# Patient Record
Sex: FEMALE | Race: WHITE | Employment: OTHER | URBAN - METROPOLITAN AREA
[De-identification: names, ages, dates, MRNs, and addresses within clinical notes are randomized per-mention and may not be internally consistent; named-entity substitution may affect disease eponyms.]

---

## 2024-09-09 ENCOUNTER — TELEPHONE (OUTPATIENT)
Age: 29
End: 2024-09-09

## 2024-09-09 ENCOUNTER — ANESTHESIA EVENT (OUTPATIENT)
Dept: PERIOP | Facility: HOSPITAL | Age: 29
End: 2024-09-09
Payer: COMMERCIAL

## 2024-09-09 ENCOUNTER — OFFICE VISIT (OUTPATIENT)
Dept: SURGERY | Facility: CLINIC | Age: 29
End: 2024-09-09

## 2024-09-09 VITALS
RESPIRATION RATE: 18 BRPM | BODY MASS INDEX: 25.18 KG/M2 | TEMPERATURE: 98.2 F | HEIGHT: 67 IN | SYSTOLIC BLOOD PRESSURE: 118 MMHG | DIASTOLIC BLOOD PRESSURE: 78 MMHG | HEART RATE: 98 BPM | OXYGEN SATURATION: 97 % | WEIGHT: 160.4 LBS

## 2024-09-09 DIAGNOSIS — L05.01 PILONIDAL CYST WITH ABSCESS: Primary | ICD-10-CM

## 2024-09-09 PROCEDURE — 99203 OFFICE O/P NEW LOW 30 MIN: CPT | Performed by: SURGERY

## 2024-09-09 RX ORDER — FLUCONAZOLE 150 MG/1
150 TABLET ORAL DAILY
COMMUNITY
Start: 2024-06-11 | End: 2024-09-09

## 2024-09-09 RX ORDER — IBUPROFEN 600 MG/1
600 TABLET, FILM COATED ORAL EVERY 6 HOURS PRN
COMMUNITY

## 2024-09-09 RX ORDER — SULFAMETHOXAZOLE/TRIMETHOPRIM 800-160 MG
1 TABLET ORAL EVERY 12 HOURS SCHEDULED
COMMUNITY
Start: 2024-09-06 | End: 2024-09-10

## 2024-09-09 RX ORDER — METRONIDAZOLE 500 MG/1
500 TABLET ORAL EVERY 12 HOURS
COMMUNITY
Start: 2024-06-11 | End: 2024-09-09

## 2024-09-09 RX ORDER — CEPHALEXIN 500 MG/1
500 CAPSULE ORAL
COMMUNITY
Start: 2024-09-06 | End: 2024-09-10

## 2024-09-09 RX ORDER — SODIUM CHLORIDE, SODIUM LACTATE, POTASSIUM CHLORIDE, CALCIUM CHLORIDE 600; 310; 30; 20 MG/100ML; MG/100ML; MG/100ML; MG/100ML
125 INJECTION, SOLUTION INTRAVENOUS CONTINUOUS
Status: CANCELLED | OUTPATIENT
Start: 2024-09-09

## 2024-09-09 NOTE — PROGRESS NOTES
Consult- General Surgery   Shital Carreno 28 y.o. female MRN: [id number]  Unit/Bed#:  Encounter: [id number]    Assessment & Plan     Assessment:  Pilonidal cyst with abscess  Plan:  In light of the size of the abscess I advised the patient to undergo incision and drainage of pilonidal cyst abscess with open wound packing tomorrow under LMA intubation at the hospital.  I discussed the operative procedure, risk, benefits, alternatives and possible complications, she understood and agreed to proceed.    History of Present Illness     HPI:  Shital Carreno is a 28 y.o. female who presents to my office accompanied by her mother for evaluation of pilonidal cyst.  The patient stated that she started complaining of bleeding in the upper butt crack last Tuesday, subsequently she noted a lump that became painful with redness and increased temperature.  She was seen by a dermatologist today and referred to us for surgical evaluation.  The patient was on antibiotics with Keflex and Bactrim, she was also told to apply heat to the area with warm bath.  She denies having any fever, chills or any other constitutional symptoms except for the significant amount of pain.    Review of Systems  The rest of the review of system total of 10 were negative except for the HPI.    Historical Information   History reviewed. No pertinent past medical history.  Past Surgical History:   Procedure Laterality Date    WISDOM TOOTH EXTRACTION       Social History   Social History     Substance and Sexual Activity   Alcohol Use Yes    Comment: social     Social History     Substance and Sexual Activity   Drug Use Never     Social History     Tobacco Use   Smoking Status Never    Passive exposure: Never   Smokeless Tobacco Never     Family History: non-contributory    Meds/Allergies   all medications and allergies reviewed     Current Outpatient Medications:     cephalexin (KEFLEX) 500 mg capsule, Take 500 mg by mouth, Disp: , Rfl:     ibuprofen  "(MOTRIN) 600 mg tablet, Take 600 mg by mouth every 6 (six) hours as needed for mild pain, Disp: , Rfl:     sulfamethoxazole-trimethoprim (BACTRIM DS) 800-160 mg per tablet, Take 1 tablet by mouth every 12 (twelve) hours, Disp: , Rfl:   No Known Allergies    Objective     Current Vitals:   Blood Pressure: 118/78 (09/09/24 1357)  Pulse: 98 (09/09/24 1357)  Temperature: 98.2 °F (36.8 °C) (09/09/24 1357)  Respirations: 18 (09/09/24 1357)  Height: 5' 7\" (170.2 cm) (09/09/24 1357)  Weight - Scale: 72.8 kg (160 lb 6.4 oz) (09/09/24 1357)  SpO2: 97 % (09/09/24 1357)    Physical Exam  Vitals and nursing note reviewed.   Constitutional:       General: She is not in acute distress.  Cardiovascular:      Rate and Rhythm: Normal rate and regular rhythm.   Pulmonary:      Effort: No respiratory distress.      Breath sounds: Normal breath sounds.   Abdominal:      Palpations: Abdomen is soft. There is no mass.      Tenderness: There is no abdominal tenderness.   Genitourinary:     Comments: There is an abscess in the upper butt crack measuring approximately 5 x 6 cm, increased temperature, small amount of drainage through the skin, tender to palpation.  Skin:     General: Skin is warm.      Coloration: Skin is not jaundiced.      Findings: No erythema or rash.   Neurological:      Mental Status: She is alert and oriented to person, place, and time.      Cranial Nerves: No cranial nerve deficit.   Psychiatric:         Mood and Affect: Mood normal.         Behavior: Behavior normal.       "

## 2024-09-09 NOTE — H&P (VIEW-ONLY)
Consult- General Surgery   Shital Carreno 28 y.o. female MRN: [id number]  Unit/Bed#:  Encounter: [id number]    Assessment & Plan     Assessment:  Pilonidal cyst with abscess  Plan:  In light of the size of the abscess I advised the patient to undergo incision and drainage of pilonidal cyst abscess with open wound packing tomorrow under LMA intubation at the hospital.  I discussed the operative procedure, risk, benefits, alternatives and possible complications, she understood and agreed to proceed.    History of Present Illness     HPI:  Shital Carreno is a 28 y.o. female who presents to my office accompanied by her mother for evaluation of pilonidal cyst.  The patient stated that she started complaining of bleeding in the upper butt crack last Tuesday, subsequently she noted a lump that became painful with redness and increased temperature.  She was seen by a dermatologist today and referred to us for surgical evaluation.  The patient was on antibiotics with Keflex and Bactrim, she was also told to apply heat to the area with warm bath.  She denies having any fever, chills or any other constitutional symptoms except for the significant amount of pain.    Review of Systems  The rest of the review of system total of 10 were negative except for the HPI.    Historical Information   History reviewed. No pertinent past medical history.  Past Surgical History:   Procedure Laterality Date    WISDOM TOOTH EXTRACTION       Social History   Social History     Substance and Sexual Activity   Alcohol Use Yes    Comment: social     Social History     Substance and Sexual Activity   Drug Use Never     Social History     Tobacco Use   Smoking Status Never    Passive exposure: Never   Smokeless Tobacco Never     Family History: non-contributory    Meds/Allergies   all medications and allergies reviewed     Current Outpatient Medications:     cephalexin (KEFLEX) 500 mg capsule, Take 500 mg by mouth, Disp: , Rfl:     ibuprofen  "(MOTRIN) 600 mg tablet, Take 600 mg by mouth every 6 (six) hours as needed for mild pain, Disp: , Rfl:     sulfamethoxazole-trimethoprim (BACTRIM DS) 800-160 mg per tablet, Take 1 tablet by mouth every 12 (twelve) hours, Disp: , Rfl:   No Known Allergies    Objective     Current Vitals:   Blood Pressure: 118/78 (09/09/24 1357)  Pulse: 98 (09/09/24 1357)  Temperature: 98.2 °F (36.8 °C) (09/09/24 1357)  Respirations: 18 (09/09/24 1357)  Height: 5' 7\" (170.2 cm) (09/09/24 1357)  Weight - Scale: 72.8 kg (160 lb 6.4 oz) (09/09/24 1357)  SpO2: 97 % (09/09/24 1357)    Physical Exam  Vitals and nursing note reviewed.   Constitutional:       General: She is not in acute distress.  Cardiovascular:      Rate and Rhythm: Normal rate and regular rhythm.   Pulmonary:      Effort: No respiratory distress.      Breath sounds: Normal breath sounds.   Abdominal:      Palpations: Abdomen is soft. There is no mass.      Tenderness: There is no abdominal tenderness.   Genitourinary:     Comments: There is an abscess in the upper butt crack measuring approximately 5 x 6 cm, increased temperature, small amount of drainage through the skin, tender to palpation.  Skin:     General: Skin is warm.      Coloration: Skin is not jaundiced.      Findings: No erythema or rash.   Neurological:      Mental Status: She is alert and oriented to person, place, and time.      Cranial Nerves: No cranial nerve deficit.   Psychiatric:         Mood and Affect: Mood normal.         Behavior: Behavior normal.       "

## 2024-09-09 NOTE — TELEPHONE ENCOUNTER
Pt was referred by her dermatologist for a polinidal cyst. Dr. Blackman has a 2:00 today, but then next available is 10/02.  She is going to call back because she also has a name of another surgeon.

## 2024-09-10 ENCOUNTER — HOSPITAL ENCOUNTER (OUTPATIENT)
Facility: HOSPITAL | Age: 29
Setting detail: OUTPATIENT SURGERY
Discharge: HOME | End: 2024-09-10
Attending: SURGERY | Admitting: SURGERY
Payer: COMMERCIAL

## 2024-09-10 ENCOUNTER — ANESTHESIA (OUTPATIENT)
Dept: PERIOP | Facility: HOSPITAL | Age: 29
End: 2024-09-10
Payer: COMMERCIAL

## 2024-09-10 VITALS
RESPIRATION RATE: 21 BRPM | SYSTOLIC BLOOD PRESSURE: 98 MMHG | BODY MASS INDEX: 25.02 KG/M2 | TEMPERATURE: 97 F | DIASTOLIC BLOOD PRESSURE: 58 MMHG | WEIGHT: 159.39 LBS | OXYGEN SATURATION: 100 % | HEART RATE: 65 BPM | HEIGHT: 67 IN

## 2024-09-10 DIAGNOSIS — L05.01 PILONIDAL CYST WITH ABSCESS: ICD-10-CM

## 2024-09-10 LAB
EXT PREGNANCY TEST URINE: NEGATIVE
EXT. CONTROL: NORMAL

## 2024-09-10 PROCEDURE — 88304 TISSUE EXAM BY PATHOLOGIST: CPT | Performed by: PATHOLOGY

## 2024-09-10 PROCEDURE — 81025 URINE PREGNANCY TEST: CPT | Performed by: SURGERY

## 2024-09-10 PROCEDURE — 10081 I&D PILONIDAL CYST COMP: CPT | Performed by: SURGERY

## 2024-09-10 RX ORDER — SODIUM CHLORIDE, SODIUM LACTATE, POTASSIUM CHLORIDE, CALCIUM CHLORIDE 600; 310; 30; 20 MG/100ML; MG/100ML; MG/100ML; MG/100ML
INJECTION, SOLUTION INTRAVENOUS CONTINUOUS PRN
Status: DISCONTINUED | OUTPATIENT
Start: 2024-09-10 | End: 2024-09-10

## 2024-09-10 RX ORDER — SUCCINYLCHOLINE/SOD CL,ISO/PF 100 MG/5ML
SYRINGE (ML) INTRAVENOUS AS NEEDED
Status: DISCONTINUED | OUTPATIENT
Start: 2024-09-10 | End: 2024-09-10

## 2024-09-10 RX ORDER — MIDAZOLAM HYDROCHLORIDE 2 MG/2ML
INJECTION, SOLUTION INTRAMUSCULAR; INTRAVENOUS AS NEEDED
Status: DISCONTINUED | OUTPATIENT
Start: 2024-09-10 | End: 2024-09-10

## 2024-09-10 RX ORDER — TRAMADOL HYDROCHLORIDE 50 MG/1
TABLET ORAL
Status: COMPLETED
Start: 2024-09-10 | End: 2024-09-10

## 2024-09-10 RX ORDER — ONDANSETRON 2 MG/ML
4 INJECTION INTRAMUSCULAR; INTRAVENOUS EVERY 8 HOURS PRN
Status: DISCONTINUED | OUTPATIENT
Start: 2024-09-10 | End: 2024-09-10 | Stop reason: HOSPADM

## 2024-09-10 RX ORDER — SODIUM CHLORIDE, SODIUM LACTATE, POTASSIUM CHLORIDE, CALCIUM CHLORIDE 600; 310; 30; 20 MG/100ML; MG/100ML; MG/100ML; MG/100ML
125 INJECTION, SOLUTION INTRAVENOUS CONTINUOUS
Status: DISCONTINUED | OUTPATIENT
Start: 2024-09-10 | End: 2024-09-10 | Stop reason: HOSPADM

## 2024-09-10 RX ORDER — TRAMADOL HYDROCHLORIDE 50 MG/1
50 TABLET ORAL EVERY 6 HOURS PRN
Qty: 10 TABLET | Refills: 0 | Status: SHIPPED | OUTPATIENT
Start: 2024-09-10 | End: 2024-09-20

## 2024-09-10 RX ORDER — LIDOCAINE HYDROCHLORIDE 10 MG/ML
INJECTION, SOLUTION EPIDURAL; INFILTRATION; INTRACAUDAL; PERINEURAL AS NEEDED
Status: DISCONTINUED | OUTPATIENT
Start: 2024-09-10 | End: 2024-09-10

## 2024-09-10 RX ORDER — CEFAZOLIN SODIUM 1 G/50ML
1000 SOLUTION INTRAVENOUS ONCE
Status: COMPLETED | OUTPATIENT
Start: 2024-09-10 | End: 2024-09-10

## 2024-09-10 RX ORDER — ONDANSETRON 2 MG/ML
INJECTION INTRAMUSCULAR; INTRAVENOUS AS NEEDED
Status: DISCONTINUED | OUTPATIENT
Start: 2024-09-10 | End: 2024-09-10

## 2024-09-10 RX ORDER — ACETAMINOPHEN 325 MG/1
975 TABLET ORAL EVERY 8 HOURS PRN
Status: DISCONTINUED | OUTPATIENT
Start: 2024-09-10 | End: 2024-09-10 | Stop reason: HOSPADM

## 2024-09-10 RX ORDER — FENTANYL CITRATE/PF 50 MCG/ML
50 SYRINGE (ML) INJECTION
Status: DISCONTINUED | OUTPATIENT
Start: 2024-09-10 | End: 2024-09-10 | Stop reason: HOSPADM

## 2024-09-10 RX ORDER — MAGNESIUM HYDROXIDE 1200 MG/15ML
LIQUID ORAL AS NEEDED
Status: DISCONTINUED | OUTPATIENT
Start: 2024-09-10 | End: 2024-09-10 | Stop reason: HOSPADM

## 2024-09-10 RX ORDER — TRAMADOL HYDROCHLORIDE 50 MG/1
50 TABLET ORAL EVERY 6 HOURS PRN
Status: DISCONTINUED | OUTPATIENT
Start: 2024-09-10 | End: 2024-09-10 | Stop reason: HOSPADM

## 2024-09-10 RX ORDER — HYDROMORPHONE HCL/PF 1 MG/ML
0.5 SYRINGE (ML) INJECTION
Status: DISCONTINUED | OUTPATIENT
Start: 2024-09-10 | End: 2024-09-10 | Stop reason: HOSPADM

## 2024-09-10 RX ORDER — FENTANYL CITRATE 50 UG/ML
INJECTION, SOLUTION INTRAMUSCULAR; INTRAVENOUS AS NEEDED
Status: DISCONTINUED | OUTPATIENT
Start: 2024-09-10 | End: 2024-09-10

## 2024-09-10 RX ORDER — PROPOFOL 10 MG/ML
INJECTION, EMULSION INTRAVENOUS AS NEEDED
Status: DISCONTINUED | OUTPATIENT
Start: 2024-09-10 | End: 2024-09-10

## 2024-09-10 RX ORDER — DEXAMETHASONE SODIUM PHOSPHATE 10 MG/ML
INJECTION, SOLUTION INTRAMUSCULAR; INTRAVENOUS AS NEEDED
Status: DISCONTINUED | OUTPATIENT
Start: 2024-09-10 | End: 2024-09-10

## 2024-09-10 RX ADMIN — LIDOCAINE HYDROCHLORIDE 50 MG: 10 INJECTION, SOLUTION EPIDURAL; INFILTRATION; INTRACAUDAL; PERINEURAL at 09:04

## 2024-09-10 RX ADMIN — TRAMADOL HYDROCHLORIDE 50 MG: 50 TABLET ORAL at 10:31

## 2024-09-10 RX ADMIN — SODIUM CHLORIDE, SODIUM LACTATE, POTASSIUM CHLORIDE, AND CALCIUM CHLORIDE: .6; .31; .03; .02 INJECTION, SOLUTION INTRAVENOUS at 08:16

## 2024-09-10 RX ADMIN — TRAMADOL HYDROCHLORIDE 50 MG: 50 TABLET, COATED ORAL at 10:31

## 2024-09-10 RX ADMIN — FENTANYL CITRATE 100 MCG: 50 INJECTION, SOLUTION INTRAMUSCULAR; INTRAVENOUS at 09:04

## 2024-09-10 RX ADMIN — DEXMEDETOMIDINE HYDROCHLORIDE 10 MCG: 100 INJECTION, SOLUTION, CONCENTRATE INTRAVENOUS at 09:19

## 2024-09-10 RX ADMIN — ONDANSETRON 4 MG: 2 INJECTION INTRAMUSCULAR; INTRAVENOUS at 09:31

## 2024-09-10 RX ADMIN — MIDAZOLAM HYDROCHLORIDE 2 MG: 1 INJECTION, SOLUTION INTRAMUSCULAR; INTRAVENOUS at 08:58

## 2024-09-10 RX ADMIN — Medication 90 MG: at 09:04

## 2024-09-10 RX ADMIN — DEXMEDETOMIDINE HYDROCHLORIDE 12 MCG: 100 INJECTION, SOLUTION, CONCENTRATE INTRAVENOUS at 09:07

## 2024-09-10 RX ADMIN — DEXMEDETOMIDINE HYDROCHLORIDE 8 MCG: 100 INJECTION, SOLUTION, CONCENTRATE INTRAVENOUS at 09:04

## 2024-09-10 RX ADMIN — PROPOFOL 200 MG: 10 INJECTION, EMULSION INTRAVENOUS at 09:04

## 2024-09-10 RX ADMIN — DEXAMETHASONE SODIUM PHOSPHATE 10 MG: 10 INJECTION INTRAMUSCULAR; INTRAVENOUS at 09:31

## 2024-09-10 RX ADMIN — CEFAZOLIN SODIUM 1000 MG: 1 SOLUTION INTRAVENOUS at 09:10

## 2024-09-10 NOTE — ANESTHESIA POSTPROCEDURE EVALUATION
Post-Op Assessment Note    CV Status:  Stable  Pain Score: 0    Pain management: adequate       Mental Status:  Sleepy   Hydration Status:  Stable   PONV Controlled:  None   Airway Patency:  Patent  Airway: intubated  Two or more mitigation strategies used for obstructive sleep apnea   Post Op Vitals Reviewed: Yes    No anethesia notable event occurred.    Staff: CRNA               BP   101/54   Temp   98   Pulse  77   Resp   16   SpO2   98

## 2024-09-10 NOTE — ANESTHESIA PREPROCEDURE EVALUATION
Procedure:  INCISION AND DRAINAGE (I&D) BUTTOCK abscess (Buttocks)    Relevant Problems   ANESTHESIA (within normal limits)      CARDIO (within normal limits)      ENDO (within normal limits)      GI/HEPATIC (within normal limits)      /RENAL (within normal limits)      GYN (within normal limits)      HEMATOLOGY (within normal limits)      MUSCULOSKELETAL (within normal limits)      NEURO/PSYCH (within normal limits)      PULMONARY (within normal limits)        Physical Exam    Airway    Mallampati score: II  TM Distance: >3 FB  Neck ROM: full     Dental        Cardiovascular  Cardiovascular exam normal    Pulmonary  Pulmonary exam normal     Other Findings  post-pubertal.      Anesthesia Plan  ASA Score- 1     Anesthesia Type- general with ASA Monitors.         Additional Monitors:     Airway Plan: ETT.           Plan Factors-Exercise tolerance (METS): >4 METS.    Chart reviewed. EKG reviewed. Imaging results reviewed. Existing labs reviewed. Patient summary reviewed.    Patient is not a current smoker.      Obstructive sleep apnea risk education given perioperatively.        Induction- intravenous.    Postoperative Plan- Plan for postoperative opioid use. Planned trial extubation        Informed Consent- Anesthetic plan and risks discussed with patient.  I personally reviewed this patient with the CRNA. Discussed and agreed on the Anesthesia Plan with the CRNA..

## 2024-09-10 NOTE — OP NOTE
OPERATIVE REPORT  PATIENT NAME: Shital Carreno    :  1995  MRN: [id number]  Pt Location: [facility] OR ROOM 05    SURGERY DATE: 9/10/2024    Surgeons and Role:     * Rolando Blackman MD - Primary    Preop Diagnosis:  Pilonidal cyst with abscess [L05.01]    Post-Op Diagnosis Codes:     * Pilonidal cyst with abscess [L05.01]    Procedure(s):  INCISION AND DRAINAGE (I&D) BUTTOCK abscess    Specimen(s):  ID Type Source Tests Collected by Time Destination   1 : Pilonidal Cyst Tissue Pilonidal Cyst/Sinus TISSUE EXAM oRlando Blackman MD 9/10/2024 0923        Estimated Blood Loss:   10 mL    Drains:  None    Anesthesia Type:   General/LMA    Operative Indications:  Pilonidal cyst with abscess [L05.01]    Operative Findings:  Large abscess cavity measuring approximately 5 cm, copious amount of foul-smelling pus drained out.    Complications:   None    Procedure and Technique:  The patient was identified and the patient was placed in the operating table in a supine position.  After adequate esthesia induction and satisfactory endotracheal intubation she was repositioned in prone.  The upper butt crack was prepped and draped in sterile usual fashion with Betadine solution.  Timeout was called the patient was identified as was surgical site.    An elliptical incision was made over the abscess cavity with a scalpel, taken down through the subcutaneous tissue with cautery, copious amount of foul-smelling pus was drained out.  The incision was taken down to the sacrum.  The entire abscess cavity was removed.  Hemostasis was accomplished with cautery.  Wound was copiously irrigated with saline solution.  Wound was packed open with Kerlix and saline, sterile dressing was applied.  At the end of the case instrument, needles, and sponges counts were correct.  Patient tolerated procedure well.   I was present for the entire procedure. and A qualified resident physician was not available.    Patient Disposition:  PACU , hemodynamically  stable, and extubated and stable             SIGNATURE: Rolando Blackman MD  DATE: September 10, 2024  TIME: 9:38 AM

## 2024-09-10 NOTE — DISCHARGE INSTR - AVS FIRST PAGE
SURGERY INSTRUCTIONS    No diet restriction for this surgery  May shower every day starting Thursday 9/12  Remove packing in the shower Thursday 9/12  Dry wound well after showering and apply dry gauze on the surface of the wound  Daily dressings changes or more often as needed  Call office to make an appointment in 2 weeks [phone]  Call office with any issues regarding the surgery  No driving, heavy lifting or strenuous exercise for one week  Resume home medications starting today  Resume blood thinners starting tomorrow.  (Aspirin, Coumadin, Eliquis, Xarelto)  Apply ice to the incisions. 10 minutes every hour for 2 days  May take Tramadol, regular Tylenol or ibuprofen for pain. Alternating narcotics with ibuprofen or Advil will have better pain control.  May take Colace for constipation  Please let us know how we did, you will receive a survey in the mail or via email.  Please respond to help us improved.

## 2024-09-11 ENCOUNTER — TELEPHONE (OUTPATIENT)
Age: 29
End: 2024-09-11

## 2024-09-11 NOTE — TELEPHONE ENCOUNTER
Patients mother called in to go over post-op bandage care. Warm transferred patient to [facility] for further assistance.

## 2024-09-11 NOTE — TELEPHONE ENCOUNTER
Patients mother calling about dressing change instructions. Stated she removed the outer 4x4s but was unsure if she was to remove the packing. Advised that she could leave packing until tomorrow and remove in the shower per discharge instructions.  Notes minimal drainage on packing and stated there was no drainage on 4x4s.    She verbalized understanding and will return call with any further questions  #[phone]

## 2024-09-18 PROCEDURE — 88304 TISSUE EXAM BY PATHOLOGIST: CPT | Performed by: PATHOLOGY

## 2024-10-01 ENCOUNTER — NURSE TRIAGE (OUTPATIENT)
Age: 29
End: 2024-10-01

## 2024-10-01 NOTE — PATIENT COMMUNICATION
"Pt called in. Pt POD 21 Procedure: INCISION AND DRAINAGE (I&D) BUTTOCK abscess (Buttocks) with Dr. Blackman.     Pt reports \"white bumps\" in and around wound. Denies any other sxs including acute pain, endorses continued minimal drainage on gauze.     [email or web address] message sent to Pt requesting photo. Pt agreeable to send photo. Will await Pt's [employer] response.   "

## 2024-10-17 ENCOUNTER — OFFICE VISIT (OUTPATIENT)
Dept: SURGERY | Facility: CLINIC | Age: 29
End: 2024-10-17

## 2024-10-17 VITALS
HEIGHT: 67 IN | HEART RATE: 70 BPM | TEMPERATURE: 97.8 F | RESPIRATION RATE: 18 BRPM | WEIGHT: 167 LBS | OXYGEN SATURATION: 98 % | DIASTOLIC BLOOD PRESSURE: 72 MMHG | BODY MASS INDEX: 26.21 KG/M2 | SYSTOLIC BLOOD PRESSURE: 108 MMHG

## 2024-10-17 DIAGNOSIS — Z48.89 POSTOPERATIVE VISIT: Primary | ICD-10-CM

## 2024-10-17 PROCEDURE — 99024 POSTOP FOLLOW-UP VISIT: CPT | Performed by: SURGERY

## 2024-10-17 NOTE — PROGRESS NOTES
"Post-Op Follow Up- General Surgery   Shital Carreno 29 y.o. female MRN: [id number]  Unit/Bed#:  Encounter: [id number]    Assessment & Plan     Assessment:  Status post incision and drainage of infected pilonidal cyst with abscess, improving  Plan:  Wound is healing nicely with good granulation tissue.  Patient will continue with daily dressing changes and she will return to my office in 1 month for follow-up.    History of Present Illness     HPI:  Shital Carreno is a 29 y.o. female who presents to my office for first postop follow-up after incision and drainage open wound packing of infected spinal cyst with abscess from September 10.  Patient stated doing well, having no pain or discomfort from the incision.  Patient is back to work for the past 3 weeks.    Historical Information   Past Medical History:   Diagnosis Date    HPV (human papilloma virus) infection      Past Surgical History:   Procedure Laterality Date    INCISION AND DRAINAGE OF WOUND N/A 9/10/2024    Procedure: INCISION AND DRAINAGE (I&D) BUTTOCK abscess;  Surgeon: Rolando Blackman MD;  Location: [facility] OR;  Service: General    WISDOM TOOTH EXTRACTION       Social History   Social History     Substance and Sexual Activity   Alcohol Use Yes    Comment: social     Social History     Substance and Sexual Activity   Drug Use Never     Social History     Tobacco Use   Smoking Status Former    Types: Cigarettes    Passive exposure: Never   Smokeless Tobacco Never     Family History: Family history non-contributory    Meds/Allergies   all medications and allergies reviewed     Current Outpatient Medications:     ibuprofen (MOTRIN) 600 mg tablet, Take 600 mg by mouth every 6 (six) hours as needed for mild pain, Disp: , Rfl:   No Known Allergies    Objective     Current Vitals:   Blood Pressure: 108/72 (10/17/24 1425)  Pulse: 70 (10/17/24 1425)  Temperature: 97.8 °F (36.6 °C) (10/17/24 1425)  Respirations: 18 (10/17/24 1425)  Height: 5' 7\" (170.2 cm) " (10/17/24 1425)  Weight - Scale: 75.8 kg (167 lb) (10/17/24 1425)  SpO2: 98 % (10/17/24 1425)    Physical Exam  Vitals and nursing note reviewed.   Genitourinary:     Comments: Incision from the upper butt crack is healing nicely with good granulation tissue measuring approximately 4 x 1.5 cm and it is 2.5 cm deep, no evidence of infection.

## 2024-12-09 ENCOUNTER — OFFICE VISIT (OUTPATIENT)
Dept: SURGERY | Facility: CLINIC | Age: 29
End: 2024-12-09

## 2024-12-09 VITALS
BODY MASS INDEX: 26.27 KG/M2 | WEIGHT: 167.4 LBS | HEIGHT: 67 IN | SYSTOLIC BLOOD PRESSURE: 120 MMHG | TEMPERATURE: 97.9 F | DIASTOLIC BLOOD PRESSURE: 74 MMHG | HEART RATE: 57 BPM | RESPIRATION RATE: 18 BRPM | OXYGEN SATURATION: 97 %

## 2024-12-09 DIAGNOSIS — Z09 POSTOPERATIVE FOLLOW-UP: Primary | ICD-10-CM

## 2024-12-09 PROCEDURE — 99024 POSTOP FOLLOW-UP VISIT: CPT | Performed by: SURGERY

## 2024-12-09 NOTE — PROGRESS NOTES
"Name: Shital Carreno      : 1995      MRN: [id number]  Encounter Provider: Rolando Blackman MD  Encounter Date: 2024   Encounter department: [facility] SURGERY [place]  :  Assessment & Plan  Postoperative follow-up  Status post incision and drainage of infected pilonidal cyst with abscess, improving.     Patient was advised to do dressing changes twice a day with dry gauze.  She will return to my office in 2 weeks for follow-up.      History of Present Illness   HPI  Shital Carreno is a 29 y.o. female who presents to my office for second postop follow-up after incision and drainage of pilonidal cyst abscess from September 10.  Patient offers no complaints at this time.  She stated that the wound is not healed completely.       Objective   /74 (BP Location: Left arm, Patient Position: Sitting, Cuff Size: Standard)   Pulse 57   Temp 97.9 °F (36.6 °C)   Resp 18   Ht 5' 7\" (1.702 m)   Wt 75.9 kg (167 lb 6.4 oz)   SpO2 97%   BMI 26.22 kg/m²      Physical Exam  Vitals and nursing note reviewed.   Genitourinary:     Comments: Incision from the upper buttock has hypergranulation tissue, measures approximately 3 x 1 cm and it is 1.5 cm deep, no evidence of infection at this time.  Patient was given specific instructions over the wound to heal faster.          "

## 2024-12-17 ENCOUNTER — TELEPHONE (OUTPATIENT)
Dept: SURGERY | Facility: CLINIC | Age: 29
End: 2024-12-17

## 2024-12-17 NOTE — TELEPHONE ENCOUNTER
Spoke with pt. She stated that she sent [facility] all the necessary paperwork for her to be enroll and has not heard back from then in 2 months. She will reach out to them to see if they have any updates on her status. Informed pt that as of right now for her next visit she will have a $30 copay. Pt verbalized understanding.

## 2024-12-31 ENCOUNTER — OFFICE VISIT (OUTPATIENT)
Dept: SURGERY | Facility: CLINIC | Age: 29
End: 2024-12-31

## 2024-12-31 VITALS
BODY MASS INDEX: 26.34 KG/M2 | TEMPERATURE: 97.5 F | SYSTOLIC BLOOD PRESSURE: 122 MMHG | RESPIRATION RATE: 18 BRPM | WEIGHT: 167.8 LBS | DIASTOLIC BLOOD PRESSURE: 74 MMHG | HEIGHT: 67 IN | HEART RATE: 72 BPM | OXYGEN SATURATION: 98 %

## 2024-12-31 DIAGNOSIS — L05.91 PILONIDAL CYST: Primary | ICD-10-CM

## 2024-12-31 PROCEDURE — 99213 OFFICE O/P EST LOW 20 MIN: CPT | Performed by: SURGERY

## 2024-12-31 NOTE — PROGRESS NOTES
Name: Shital Carreno      : 1995      MRN: [id number]  Encounter Provider: Rolando Blackman MD  Encounter Date: 2024   Encounter department: [facility] SURG[facility] [place]  :  Assessment & Plan  Pilonidal cyst  Status post incision and drainage of pilonidal cyst with abscess, improving     Patient still has an opening but has improved in size.  She will do dressing changes twice a day with gauze.  Patient will return to my office in 1 month for follow-up.      History of Present Illness   HPI  Shital Carreno is a 29 y.o. female who presents to my office for follow-up after incision and drainage of pilonidal cyst with abscess from December 10.  She stated that the wound has decreased in size, she does have occasional bleeding on and off.  Her mother is doing the dressing changes.  She denies having any pain, discomfort or any other constitutional symptoms.    Review of Systems  The rest of the review of system total of 10 were negative except for the HPI.    Pertinent Medical History     Medical History Reviewed by provider this encounter:  Tobacco  Allergies  Meds  Problems  Med Hx  Surg Hx  Fam Hx     .  Past Medical History   Past Medical History:   Diagnosis Date    HPV (human papilloma virus) infection      Past Surgical History:   Procedure Laterality Date    INCISION AND DRAINAGE OF WOUND N/A 9/10/2024    Procedure: INCISION AND DRAINAGE (I&D) BUTTOCK abscess;  Surgeon: Rolando Blackman MD;  Location: [facility] OR;  Service: General    WISDOM TOOTH EXTRACTION       History reviewed. No pertinent family history.   reports that she has quit smoking. Her smoking use included cigarettes. She has never been exposed to tobacco smoke. She has never used smokeless tobacco. She reports current alcohol use. She reports that she does not use drugs.  Current Outpatient Medications on File Prior to Visit   Medication Sig Dispense Refill    ibuprofen (MOTRIN) 600 mg tablet Take 600 mg by mouth every 6  "(six) hours as needed for mild pain       No current facility-administered medications on file prior to visit.   No Known Allergies   Current Outpatient Medications on File Prior to Visit   Medication Sig Dispense Refill    ibuprofen (MOTRIN) 600 mg tablet Take 600 mg by mouth every 6 (six) hours as needed for mild pain       No current facility-administered medications on file prior to visit.      Social History     Tobacco Use    Smoking status: Former     Types: Cigarettes     Passive exposure: Never    Smokeless tobacco: Never   Vaping Use    Vaping status: Never Used   Substance and Sexual Activity    Alcohol use: Yes     Comment: social    Drug use: Never    Sexual activity: Not on file        Objective   /74 (BP Location: Left arm, Patient Position: Sitting, Cuff Size: Standard)   Pulse 72   Temp 97.5 °F (36.4 °C)   Resp 18   Ht 5' 7\" (1.702 m)   Wt 76.1 kg (167 lb 12.8 oz)   SpO2 98%   BMI 26.28 kg/m²      Physical Exam  Vitals and nursing note reviewed.   Constitutional:       General: She is not in acute distress.  Cardiovascular:      Rate and Rhythm: Normal rate and regular rhythm.   Pulmonary:      Effort: No respiratory distress.      Breath sounds: Normal breath sounds.   Abdominal:      Palpations: Abdomen is soft. There is no mass.      Tenderness: There is no abdominal tenderness.   Genitourinary:     Comments: Incision from the spinal cyst measures approximately 1.2 cm x 0.5 cm and it is 1 cm deep, still with small amount of hypergranulation tissue.  No evidence of infection.  Skin:     General: Skin is warm.      Coloration: Skin is not jaundiced.      Findings: No erythema or rash.   Neurological:      Mental Status: She is alert and oriented to person, place, and time.      Cranial Nerves: No cranial nerve deficit.   Psychiatric:         Mood and Affect: Mood normal.         Behavior: Behavior normal.           "

## 2025-01-29 ENCOUNTER — OFFICE VISIT (OUTPATIENT)
Dept: SURGERY | Facility: CLINIC | Age: 30
End: 2025-01-29

## 2025-01-29 VITALS
SYSTOLIC BLOOD PRESSURE: 120 MMHG | TEMPERATURE: 97.5 F | BODY MASS INDEX: 26.24 KG/M2 | WEIGHT: 167.2 LBS | DIASTOLIC BLOOD PRESSURE: 72 MMHG | RESPIRATION RATE: 18 BRPM | OXYGEN SATURATION: 97 % | HEART RATE: 57 BPM | HEIGHT: 67 IN

## 2025-01-29 DIAGNOSIS — L05.91 PILONIDAL CYST: Primary | ICD-10-CM

## 2025-01-29 PROCEDURE — 99213 OFFICE O/P EST LOW 20 MIN: CPT | Performed by: SURGERY

## 2025-01-29 NOTE — PROGRESS NOTES
Name: Shital Carreno      : 1995      MRN: [id number]  Encounter Provider: Rolando Blackman MD  Encounter Date: 2025   Encounter department: [facility] [place]  :  Assessment & Plan  Pilonidal cyst  Patient is a status post excision of pilonidal cyst with abscess, improving     She still has an opening measuring approximately 2 mm and it is 0.5 cm deep without evidence of infection or hypergranulation tissue.  Patient was advised to continue with daily dressing changes center the wound is completely closed.  If the wound is not completely closed in a month I advised her to contact our office or to make an appointment.  She is otherwise discharged from my care.      History of Present Illness   HPI  Shital Carreno is a 29 y.o. female who presents to my office for follow-up.  The patient is known to our practice from prior incision and drainage of pilonidal cyst with abscess.  She has been coming to our office for postop follow-up and to check on the wound.  She offers no complaints at this time.  Patient stated that the wound is about to close.  She denies having any pain but she does have small amount of drainage.      Review of Systems  The rest of the review of system total of 10 were negative except for the HPI.    Pertinent Medical History     Medical History Reviewed by provider this encounter:  Tobacco  Allergies  Meds  Problems  Med Hx  Surg Hx  Fam Hx     .    Medical History Reviewed by provider this encounter:  Tobacco  Allergies  Meds  Problems  Med Hx  Surg Hx  Fam Hx     .  Past Medical History   Past Medical History:   Diagnosis Date    HPV (human papilloma virus) infection      Past Surgical History:   Procedure Laterality Date    INCISION AND DRAINAGE OF WOUND N/A 9/10/2024    Procedure: INCISION AND DRAINAGE (I&D) BUTTOCK abscess;  Surgeon: Rolando Blackman MD;  Location: [facility] OR;  Service: General    WISDOM TOOTH EXTRACTION       History reviewed. No  "pertinent family history.   reports that she has quit smoking. Her smoking use included cigarettes. She has never been exposed to tobacco smoke. She has never used smokeless tobacco. She reports current alcohol use. She reports that she does not use drugs.  Current Outpatient Medications on File Prior to Visit   Medication Sig Dispense Refill    ibuprofen (MOTRIN) 600 mg tablet Take 600 mg by mouth every 6 (six) hours as needed for mild pain       No current facility-administered medications on file prior to visit.   No Known Allergies   Current Outpatient Medications on File Prior to Visit   Medication Sig Dispense Refill    ibuprofen (MOTRIN) 600 mg tablet Take 600 mg by mouth every 6 (six) hours as needed for mild pain       No current facility-administered medications on file prior to visit.      Social History     Tobacco Use    Smoking status: Former     Types: Cigarettes     Passive exposure: Never    Smokeless tobacco: Never   Vaping Use    Vaping status: Never Used   Substance and Sexual Activity    Alcohol use: Yes     Comment: social    Drug use: Never    Sexual activity: Not on file        Objective   /72 (BP Location: Left arm, Patient Position: Sitting, Cuff Size: Standard)   Pulse 57   Temp 97.5 °F (36.4 °C)   Resp 18   Ht 5' 7\" (1.702 m)   Wt 75.8 kg (167 lb 3.2 oz)   SpO2 97%   BMI 26.19 kg/m²      Physical Exam  Vitals and nursing note reviewed.   Constitutional:       General: She is not in acute distress.  Cardiovascular:      Rate and Rhythm: Normal rate and regular rhythm.   Pulmonary:      Effort: No respiratory distress.      Breath sounds: Normal breath sounds.   Abdominal:      Palpations: Abdomen is soft. There is no mass.      Tenderness: There is no abdominal tenderness.   Genitourinary:     Comments: Incision from the panel cyst with abscess measures approximately 2 mm and it is 0.5 cm deep, no evidence of infection or hypergranulation tissue.  Skin:     General: Skin is " warm.      Coloration: Skin is not jaundiced.      Findings: No erythema or rash.   Neurological:      Mental Status: She is alert and oriented to person, place, and time.      Cranial Nerves: No cranial nerve deficit.   Psychiatric:         Mood and Affect: Mood normal.         Behavior: Behavior normal.

## (undated) DEVICE — INTENDED FOR TISSUE SEPARATION, AND OTHER PROCEDURES THAT REQUIRE A SHARP SURGICAL BLADE TO PUNCTURE OR CUT.: Brand: BARD-PARKER SAFETY BLADES SIZE 15, STERILE

## (undated) DEVICE — POOLE SUCTION HANDLE: Brand: CARDINAL HEALTH

## (undated) DEVICE — ABDOMINAL PAD: Brand: DERMACEA

## (undated) DEVICE — KERLIX BANDAGE ROLL: Brand: KERLIX

## (undated) DEVICE — DRAPE EQUIPMENT RF WAND

## (undated) DEVICE — GLOVE SRG BIOGEL ECLIPSE 7.5

## (undated) DEVICE — LIGHT HANDLE COVER SLEEVE DISP BLUE STELLAR

## (undated) DEVICE — TUBING SUCTION 5MM X 12 FT

## (undated) DEVICE — BETHLEHEM UNIVERSAL MINOR GEN: Brand: CARDINAL HEALTH

## (undated) DEVICE — GLOVE INDICATOR PI UNDERGLOVE SZ 7.5 BLUE

## (undated) DEVICE — 4-PORT MANIFOLD: Brand: NEPTUNE 2